# Patient Record
Sex: FEMALE | Race: WHITE | NOT HISPANIC OR LATINO | Employment: STUDENT | URBAN - METROPOLITAN AREA
[De-identification: names, ages, dates, MRNs, and addresses within clinical notes are randomized per-mention and may not be internally consistent; named-entity substitution may affect disease eponyms.]

---

## 2024-01-08 ENCOUNTER — OFFICE VISIT (OUTPATIENT)
Dept: PEDIATRICS | Facility: CLINIC | Age: 19
End: 2024-01-08
Payer: COMMERCIAL

## 2024-01-08 VITALS
WEIGHT: 134 LBS | BODY MASS INDEX: 19.18 KG/M2 | SYSTOLIC BLOOD PRESSURE: 113 MMHG | HEIGHT: 70 IN | HEART RATE: 84 BPM | DIASTOLIC BLOOD PRESSURE: 74 MMHG

## 2024-01-08 DIAGNOSIS — Z00.00 WELLNESS EXAMINATION: Primary | ICD-10-CM

## 2024-01-08 DIAGNOSIS — K58.9 IRRITABLE BOWEL SYNDROME, UNSPECIFIED TYPE: ICD-10-CM

## 2024-01-08 DIAGNOSIS — L70.9 ACNE, UNSPECIFIED ACNE TYPE: ICD-10-CM

## 2024-01-08 PROBLEM — L85.9 HYPERKERATOSIS OF SKIN: Status: RESOLVED | Noted: 2024-01-08 | Resolved: 2024-01-08

## 2024-01-08 PROBLEM — R10.9 ABDOMINAL PAIN: Status: RESOLVED | Noted: 2024-01-08 | Resolved: 2024-01-08

## 2024-01-08 PROBLEM — K21.9 GERD (GASTROESOPHAGEAL REFLUX DISEASE): Status: ACTIVE | Noted: 2024-01-08

## 2024-01-08 PROCEDURE — 96127 BRIEF EMOTIONAL/BEHAV ASSMT: CPT | Performed by: PEDIATRICS

## 2024-01-08 PROCEDURE — 90471 IMMUNIZATION ADMIN: CPT | Performed by: PEDIATRICS

## 2024-01-08 PROCEDURE — 90480 ADMN SARSCOV2 VAC 1/ONLY CMP: CPT | Performed by: PEDIATRICS

## 2024-01-08 PROCEDURE — 90686 IIV4 VACC NO PRSV 0.5 ML IM: CPT | Performed by: PEDIATRICS

## 2024-01-08 PROCEDURE — 99395 PREV VISIT EST AGE 18-39: CPT | Performed by: PEDIATRICS

## 2024-01-08 PROCEDURE — 91320 SARSCV2 VAC 30MCG TRS-SUC IM: CPT | Performed by: PEDIATRICS

## 2024-01-08 RX ORDER — DICYCLOMINE HYDROCHLORIDE 20 MG/1
TABLET ORAL EVERY 6 HOURS
COMMUNITY
Start: 2021-02-19 | End: 2024-01-08 | Stop reason: SDUPTHER

## 2024-01-08 RX ORDER — DESOGESTREL AND ETHINYL ESTRADIOL 0.15-0.03
1 KIT ORAL DAILY
Qty: 84 TABLET | Refills: 3 | Status: SHIPPED | OUTPATIENT
Start: 2024-01-08 | End: 2025-01-07

## 2024-01-08 RX ORDER — ADAPALENE AND BENZOYL PEROXIDE 3; 25 MG/G; MG/G
GEL TOPICAL
Qty: 45 G | Refills: 2 | Status: SHIPPED | OUTPATIENT
Start: 2024-01-08

## 2024-01-08 RX ORDER — ADAPALENE AND BENZOYL PEROXIDE 3; 25 MG/G; MG/G
GEL TOPICAL
COMMUNITY
Start: 2022-01-18 | End: 2024-01-08 | Stop reason: SDUPTHER

## 2024-01-08 RX ORDER — DICYCLOMINE HYDROCHLORIDE 20 MG/1
20 TABLET ORAL 4 TIMES DAILY PRN
Qty: 60 TABLET | Refills: 2 | Status: SHIPPED | OUTPATIENT
Start: 2024-01-08

## 2024-01-08 RX ORDER — DOXYCYCLINE 100 MG/1
CAPSULE ORAL
COMMUNITY
Start: 2022-01-18 | End: 2024-01-08 | Stop reason: ALTCHOICE

## 2024-01-08 ASSESSMENT — PATIENT HEALTH QUESTIONNAIRE - PHQ9
1. LITTLE INTEREST OR PLEASURE IN DOING THINGS: NOT AT ALL
SUM OF ALL RESPONSES TO PHQ9 QUESTIONS 1 AND 2: 0
2. FEELING DOWN, DEPRESSED OR HOPELESS: NOT AT ALL

## 2024-01-08 NOTE — PROGRESS NOTES
"Subjective     Twilaalan Sinclair is here for her annual WCC.    Parental Issues:  Questions or concerns: interested in contraception  Continues iwth IBS sx's- relief with Bentyl- triggers dairy and acidic food like tomato sauce - drinks oat milk       Nutrition, Elimination, and Sleep:  Nutrition:  well-balanced diet, takes foods from each food group  Elimination patterns appropriate: yes  Sleep:  normal   Concerns about body image? 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    Mental Health:  Depression Screening: no risks    CONFIDENTIAL ADOLESCENT SCREEN QUESTIONNAIRE REVIEWED WITH PATIENT AND SCANNED INTO CHART    Objective   /74   Pulse 84   Ht 1.765 m (5' 9.5\")   Wt 60.8 kg (134 lb)   BMI 19.50 kg/m²   Growth parameters are noted reviewed  General:   alert and oriented, in no acute distress   Gait:   normal   Skin:   normal   Oral cavity:   lips, mucosa, and tongue normal; teeth and gums normal   Eyes:   sclerae white, pupils equal and reactive   Ears:   normal bilaterally   Neck:   no adenopathy and thyroid not enlarged, symmetric, no tenderness/mass/nodules   Lungs:  clear to auscultation bilaterally   Heart:   regular rate and rhythm, S1, S2 normal, no murmur, click, rub or gallop   Abdomen:  soft, non-tender; bowel sounds normal; no masses, no organomegaly   :  exam deferred   Pedro Stage:   5   Extremities:  extremities normal, warm and well-perfused; negative forward bend   Neuro:  normal without focal findings and muscle tone and strength normal and symmetric     Assessment/Plan   Well adolescent.   Anticipatory guidance regarding development, safety, nutrition, physical activity, and sleep reviewed.  - Growth:  appropriate for age  - Development:  active and social   - Social:  teenage questionnaire completed and reviewed.  Issues of smoking, " vaping, substance use, sexuality, and mood discussed.    - Vaccines:  as documented  - Return in 1 year for annual well child exam or sooner if concerns arise  Discussed OCP's vs LARC methods

## 2024-03-30 ENCOUNTER — TELEPHONE (OUTPATIENT)
Dept: PEDIATRICS | Facility: CLINIC | Age: 19
End: 2024-03-30
Payer: COMMERCIAL

## 2024-03-30 NOTE — TELEPHONE ENCOUNTER
Twila has 3 areas of possible ringworm. Two on her legs and one on her arm. Mom will have her give home care a try. She has had it in the past. If not resolving in 2 weeks, she will give us a call.

## 2024-05-08 DIAGNOSIS — L70.9 ACNE, UNSPECIFIED ACNE TYPE: ICD-10-CM

## 2024-06-17 ENCOUNTER — OFFICE VISIT (OUTPATIENT)
Dept: PEDIATRICS | Facility: CLINIC | Age: 19
End: 2024-06-17
Payer: COMMERCIAL

## 2024-06-17 DIAGNOSIS — N30.01 ACUTE CYSTITIS WITH HEMATURIA: Primary | ICD-10-CM

## 2024-06-17 LAB
POC APPEARANCE, URINE: CLEAR
POC BILIRUBIN, URINE: NEGATIVE
POC BLOOD, URINE: ABNORMAL
POC COLOR, URINE: YELLOW
POC GLUCOSE, URINE: NEGATIVE MG/DL
POC KETONES, URINE: NEGATIVE MG/DL
POC LEUKOCYTES, URINE: ABNORMAL
POC NITRITE,URINE: NEGATIVE
POC PH, URINE: 6 PH
POC PROTEIN, URINE: ABNORMAL MG/DL
POC SPECIFIC GRAVITY, URINE: 1.02
POC UROBILINOGEN, URINE: 0.2 EU/DL

## 2024-06-17 PROCEDURE — 81003 URINALYSIS AUTO W/O SCOPE: CPT | Performed by: PEDIATRICS

## 2024-06-17 PROCEDURE — 87086 URINE CULTURE/COLONY COUNT: CPT

## 2024-06-17 PROCEDURE — 99213 OFFICE O/P EST LOW 20 MIN: CPT | Performed by: PEDIATRICS

## 2024-06-17 RX ORDER — AMOXICILLIN AND CLAVULANATE POTASSIUM 875; 125 MG/1; MG/1
875 TABLET, FILM COATED ORAL 2 TIMES DAILY
Qty: 14 TABLET | Refills: 0 | Status: SHIPPED | OUTPATIENT
Start: 2024-06-17 | End: 2024-06-24

## 2024-06-17 NOTE — PROGRESS NOTES
Twila Sinclair is a 19 y.o. female who presents for UTI.      HPI  Twila noticed some blood in her urine since yesterday as well as some hesitancy.  No fever or abd pain.  She is currently sexually active.  No discharge or concern for STI.    Objective   There were no vitals taken for this visit.    Physical Exam  Constitutional:       Appearance: She is normal weight.   Abdominal:      General: There is no distension.      Palpations: Abdomen is soft.         Assessment/Plan       Her clinical presentation and examination indicates the diagnosis of   1. Acute cystitis with hematuria  POCT UA Automated manually resulted    amoxicillin-pot clavulanate (Augmentin) 875-125 mg tablet    Urine Culture        Urine culture sent and abx prescribed.  Discussed UTI prevention    Today we discussed a typical course of illness, symptomatic treatment, and signs of worsening/when to seek medical care.  Supportive care measures and expected course of condition reviewed.  Followup as needed no improvement.

## 2024-06-18 LAB — BACTERIA UR CULT: NORMAL

## 2024-09-19 DIAGNOSIS — L70.9 ACNE, UNSPECIFIED ACNE TYPE: ICD-10-CM

## 2024-09-20 RX ORDER — DESOGESTREL AND ETHINYL ESTRADIOL 0.15-0.03
1 KIT ORAL DAILY
Qty: 84 TABLET | Refills: 1 | Status: SHIPPED | OUTPATIENT
Start: 2024-09-20

## 2024-11-16 DIAGNOSIS — L70.9 ACNE, UNSPECIFIED ACNE TYPE: ICD-10-CM

## 2024-11-18 RX ORDER — DESOGESTREL AND ETHINYL ESTRADIOL 0.15-0.03
1 KIT ORAL DAILY
Qty: 84 TABLET | Refills: 0 | Status: SHIPPED | OUTPATIENT
Start: 2024-11-18

## 2024-12-16 ENCOUNTER — TELEPHONE (OUTPATIENT)
Dept: PEDIATRICS | Facility: CLINIC | Age: 19
End: 2024-12-16
Payer: COMMERCIAL

## 2024-12-16 DIAGNOSIS — L70.9 ACNE, UNSPECIFIED ACNE TYPE: Primary | ICD-10-CM

## 2024-12-16 RX ORDER — NORETHINDRONE ACETATE AND ETHINYL ESTRADIOL 1MG-20(21)
1 KIT ORAL DAILY
Qty: 84 TABLET | Refills: 3 | Status: SHIPPED | OUTPATIENT
Start: 2024-12-16

## 2024-12-16 NOTE — TELEPHONE ENCOUNTER
Twila would like to change her birth control. Her current OCP is giving child headaches. Child is due for a WCC after 1/8/25. Please advise if you want an appointment? Thanks     [phone]

## 2024-12-17 ENCOUNTER — OFFICE VISIT (OUTPATIENT)
Dept: GASTROENTEROLOGY | Facility: CLINIC | Age: 19
End: 2024-12-17
Payer: COMMERCIAL

## 2024-12-17 VITALS
DIASTOLIC BLOOD PRESSURE: 72 MMHG | HEART RATE: 61 BPM | WEIGHT: 149 LBS | BODY MASS INDEX: 22.07 KG/M2 | SYSTOLIC BLOOD PRESSURE: 121 MMHG | OXYGEN SATURATION: 99 % | HEIGHT: 69 IN

## 2024-12-17 DIAGNOSIS — K58.0 IRRITABLE BOWEL SYNDROME WITH DIARRHEA: Primary | ICD-10-CM

## 2024-12-17 PROCEDURE — 3008F BODY MASS INDEX DOCD: CPT

## 2024-12-17 PROCEDURE — 99204 OFFICE O/P NEW MOD 45 MIN: CPT

## 2024-12-17 PROCEDURE — 1036F TOBACCO NON-USER: CPT

## 2024-12-17 RX ORDER — HYOSCYAMINE SULFATE 0.12 MG/1
0.12 TABLET, ORALLY DISINTEGRATING ORAL
Qty: 120 TABLET | Refills: 2 | Status: SHIPPED | OUTPATIENT
Start: 2024-12-17 | End: 2025-03-17

## 2024-12-17 RX ORDER — HYOSCYAMINE SULFATE 0.12 MG/1
0.12 TABLET, ORALLY DISINTEGRATING ORAL 4 TIMES DAILY PRN
Qty: 120 TABLET | Refills: 2 | Status: SHIPPED | OUTPATIENT
Start: 2024-12-17 | End: 2024-12-17

## 2024-12-17 ASSESSMENT — ENCOUNTER SYMPTOMS
ANAL BLEEDING: 0
ROS GI COMMENTS: SEE HPI
CONSTIPATION: 0
DIARRHEA: 1
ABDOMINAL DISTENTION: 1
ABDOMINAL PAIN: 1
BLOOD IN STOOL: 0

## 2024-12-17 NOTE — PATIENT INSTRUCTIONS
Start hyosciamine three times daily before meals and at bedtime  Stool tests/labs    IBS  Irritable bowel syndrome (IBS) is a common disorder that affects the GI tract. Signs and symptoms include cramping, abdominal pain, bloating, gas, diarrhea/constipation, or both. People with IBS may have a change in how their bowel movements look. IBS is a chronic condition, but symptoms can be controlled by managing diet, lifestyle and stress. IBS does not cause changes in the tissue of the intestine or increase your risk of colon cancer. IBS is believed to be caused by changes in the nerves and muscles that control gut sensation and movement. IBS can be worsened by different triggers, stress, and anxiety. IBS is a real medical condition, but it is not life threatening and will not lead to other serious diseases.    Ways to help IBS symptoms:   -Eliminate all lactose containing products for 4 weeks to see if your symptoms improve    -Some people with IBS symptoms improve if they stop eating gluten (wheat, barley and rye) even if they do not have celiac disease.    -Some people are sensitive to certain carbohydrates such as fructans, lactose and others known as FODMAPs (fermentable oligosaccharide, disaccharide, monosaccharide and polyols) which are found in certain grains, vegetables, fruits and dairy products. Your symptoms might get better if you follow a low FODMAP diet and then reintroduce foods one at a time. www.fodmapeverTalko.com is a great web site with instructions on how to do the low FODMAP diet. It also has recipes and lifestyle tips that help IBS symptoms.     -For cramping and bloating, try enteric coated Peppermint Oil Capsules 150-200 mg up to 3 times daily- they can have a side effect of heartburn. If you are prone to heartburn, take the peppermint oil at least 1 hour before eating, on an empty stomach.  Serena's Tummy tamers is a brand patients with IBS will use, you only need one softgel up to 3 times daily  as needed.   Alternately, you can try IBgard 90 mg capsules. Take 1-2 IBgard capsules up to three times a day as needed for symptoms.    -Exercise regularly, walking is a fast, free, and easy way to improve abdominal pain in IBS.  Some people feel better with relaxation techniques and regular exercise or having a hobby. Meeting with a counselor can also help.    -Take time to yourself every day for self care or a hobby that you love. 10 minutes of alone time can go a long way and is less than 1 percent of your day! Taking care of yourself is not selfish!    -Schedule a follow up appointment in 2-3 months. Sabra Fraga, Lulu Ponce, Estevan Rodríguez are all nurse practitioners closer to you at [facility]    -Please call the office at [phone] with any questions or concerns.

## 2024-12-17 NOTE — PROGRESS NOTES
"History Of Present Illness  Twila Sinclair is a 19 y.o. female presenting to GI clinic with a chief complaint of bloating, abdominal pain.  She is here with her dad.  Patient notes lifelong symptoms, had colic as a baby. She has been seeing [facility] since fifth grade, last seen in 2021 for symptoms.  Had an unremarkable fecal calprotectin, TTG IgG, CRP, TSH, lipase, CMP, CBC, IgA level.    Patient has long history of sharp abdominal pain with bloating after meals.  She needs to lie down in order to feel better.  She has sensation that foods are sitting in her upper abdomen after eating.  As food is digested and \"drops down\" in her abdomen, she has further discomfort wherever it moves until it reaches a certain level.  This can take a few hours after eating. She states that she is unable to belch, but once she releases gas or has a bowel movement she feels better.  She normally has at least 3 bowel movements per day after eating, denies BRBPR, melena, hematochezia, unintentional weight loss.  Patient notes spicy foods, dairy especially exacerbate symptoms.  Stress also exacerbate symptoms-she is in finals week at [employer] and symptoms have been worse.  She normally feels better after she goes to bed but woke up the other morning with continued pain at 3 AM.    Patient has tried prevacid and probiotics, FODMAP diet.  She does feel that Align probiotics have been helpful in the mornings, however she has only been taking these for about a week and a half.    Social History  She reports that she has never smoked. She has never used smokeless tobacco. No history on file for alcohol use and drug use.  She does not take NSAIDs on a regular basis      Family History  No family history on file.  The patient does not have a FH of CRC. she does have a FH of IBD. Maternal aunt (Crohn's and gallbladder disease); maternal relatives (GI ulcers and RA); MGM (lupus); MGF (multiple myeloma and gallbladder disease).     Review " "of Systems   Gastrointestinal:  Positive for abdominal distention, abdominal pain and diarrhea. Negative for anal bleeding, blood in stool and constipation.        See HPI   All other systems reviewed and are negative.        Physical Exam  Constitutional:       General: She is not in acute distress.     Appearance: Normal appearance. She is normal weight. She is not ill-appearing.   HENT:      Head: Normocephalic and atraumatic.      Mouth/Throat:      Mouth: Mucous membranes are moist.   Eyes:      General: No scleral icterus.     Conjunctiva/sclera: Conjunctivae normal.      Pupils: Pupils are equal, round, and reactive to light.   Pulmonary:      Effort: Pulmonary effort is normal.   Abdominal:      General: Bowel sounds are normal. There is no distension.      Palpations: Abdomen is soft. There is no mass.      Tenderness: There is no abdominal tenderness.      Hernia: No hernia is present.   Musculoskeletal:         General: Normal range of motion.      Cervical back: Normal range of motion.   Skin:     General: Skin is warm and dry.      Coloration: Skin is not jaundiced or pale.   Neurological:      Mental Status: She is alert. Mental status is at baseline.   Psychiatric:         Mood and Affect: Mood normal.         Behavior: Behavior normal.          Last Vital Signs  /72   Pulse 61   Ht 1.753 m (5' 9\")   Wt 67.6 kg (149 lb)   SpO2 99%   BMI 22.00 kg/m²      Relevant Results  Lab Results   Component Value Date    WBC 8.5 02/19/2021    HGB 12.8 02/19/2021    HCT 40.3 02/19/2021    MCV 96 02/19/2021     02/19/2021      Lab Results   Component Value Date    GLUCOSE 92 02/19/2021    CALCIUM 9.6 02/19/2021     02/19/2021    K 4.4 02/19/2021    CO2 27 02/19/2021     02/19/2021    BUN 8 02/19/2021    CREATININE 0.72 02/19/2021      Lab Results   Component Value Date    ALT 12 02/19/2021    AST 15 02/19/2021    ALKPHOS 94 (H) 02/19/2021    BILITOT 0.5 02/19/2021    BILIDIR 0.1 " "2021    No results found for: \"IRON\", \"TIBC\", \"IRONSAT\", \"FERRITIN\", \"PCXQNIPZ87\", \"FOLATE\"    Lab Results   Component Value Date    CALPS 11 2021    CRP <0.10 2021      Lab Results   Component Value Date    LIPASE 40 2021    No results found for: \"HGBA1C\"       - Westergren ESR: 2020: Normal.  - TTG Ig2020: Normal.     EGD/COLONOSCOPY/COLOGUARD  EGD- never       Colonoscopy- never       Assessment/Plan    Assessment & Plan  Irritable bowel syndrome with diarrhea  Patient with longstanding IBS-D symptoms (at least 3 bowel movements daily, bloating and sharp abdominal pain after eating, relieved with bowel movements and passing gas).  She saw peds GI with an unremarkable workup so far  Infectious stool studies, CRP, BMP, CBC, TSH ordered  Had minimal response to dicyclomine in the past, will start Levsin 4 times daily before meals and at bedtime  Orders:    H. Pylori Antigen, Stool; Future    Stool Pathogen Panel, PCR; Future    Ova/Para + Giardia/Cryptosporidium Antigen; Future    hyoscyamine 0.125 mg disintegrating tablet; Place 1 tablet (0.125 mg) under the tongue before breakfast, before lunch, before evening meal, and at bedtime.    CBC; Future    Basic Metabolic Panel; Future    TSH with reflex to Free T4 if abnormal; Future    C-Reactive Protein; Future    Follow-up 2 to 3 months, closer to [facility] since my office is located in [place].       Angie Beckham, ARTEM-CNP  24   "

## 2024-12-17 NOTE — ASSESSMENT & PLAN NOTE
Patient with longstanding IBS-D symptoms (at least 3 bowel movements daily, bloating and sharp abdominal pain after eating, relieved with bowel movements and passing gas).  She saw peds GI with an unremarkable workup so far  Infectious stool studies, CRP, BMP, CBC, TSH ordered  Had minimal response to dicyclomine in the past, will start Levsin 4 times daily before meals and at bedtime  Orders:    H. Pylori Antigen, Stool; Future    Stool Pathogen Panel, PCR; Future    Ova/Para + Giardia/Cryptosporidium Antigen; Future    hyoscyamine 0.125 mg disintegrating tablet; Place 1 tablet (0.125 mg) under the tongue before breakfast, before lunch, before evening meal, and at bedtime.    CBC; Future    Basic Metabolic Panel; Future    TSH with reflex to Free T4 if abnormal; Future    C-Reactive Protein; Future

## 2024-12-31 ENCOUNTER — OFFICE VISIT (OUTPATIENT)
Dept: PEDIATRICS | Facility: CLINIC | Age: 19
End: 2024-12-31
Payer: COMMERCIAL

## 2024-12-31 ENCOUNTER — TELEPHONE (OUTPATIENT)
Dept: PEDIATRICS | Facility: CLINIC | Age: 19
End: 2024-12-31

## 2024-12-31 VITALS — WEIGHT: 143.6 LBS | TEMPERATURE: 98 F | BODY MASS INDEX: 21.21 KG/M2

## 2024-12-31 DIAGNOSIS — J01.00 ACUTE NON-RECURRENT MAXILLARY SINUSITIS: Primary | ICD-10-CM

## 2024-12-31 PROCEDURE — G2211 COMPLEX E/M VISIT ADD ON: HCPCS | Performed by: PEDIATRICS

## 2024-12-31 PROCEDURE — 99213 OFFICE O/P EST LOW 20 MIN: CPT | Performed by: PEDIATRICS

## 2024-12-31 RX ORDER — AMOXICILLIN 875 MG/1
875 TABLET, FILM COATED ORAL 2 TIMES DAILY
Qty: 20 TABLET | Refills: 0 | Status: SHIPPED | OUTPATIENT
Start: 2024-12-31 | End: 2025-01-10

## 2024-12-31 NOTE — TELEPHONE ENCOUNTER
Twila calling- earache started last night, believes she is getting the start of an infection.  Wanted to do a virtual, advised to come in for eval.

## 2024-12-31 NOTE — PROGRESS NOTES
Subjective     History was provided by mother and Twila .    Twila is here with the following concern:    L sided ear pain, congestion, L sinus pressure, no fever    Objective     Temp 36.7 °C (98 °F)   Wt 65.1 kg (143 lb 9.6 oz)   BMI 21.21 kg/m²       General:  Well-appearing, well hydrated and in no acute distress     Eyes:  Lids:  normal  Conjunctivae:  normal     ENT:  Ears:  RTM: normal yes           LTM:  normal yes  Nose:  nares clear  Mouth:  mucosa moist; no visible lesions  Throat:  OP clear yes and moist; uvula midline  Neck:  supple     Respiratory:  Respiratory rate:  normal  Air exchange:  normal   Adventitious breath sounds:  none  Accessory muscle use:  none     Heart:  Regular rate and rhythm, no murmur     GI: Normal bowel sounds, soft, non-tender, no HSM     Skin:  Warm and well-perfused and no rashes apparent     Lymphatic: No nodes larger than 1 cm palpated  No firm or fixed nodes palpated       Assessment/Plan     Twila Sinclair is well-appearing, well-hydrated, in no acute distress, and afebrile at today's visit.    Her clinical presentation and examination indicates the diagnosis of clinical sinusitis with L ear pain    Her treatment plan includes Tylenol for comfort, Amox as prescribed, Flonase NS daily until sx subside    Supportive care measures and expected course of illness reviewed.    Follow up promptly for worsening or prolonged illness.    Dylan Pires MD MPH

## 2025-01-06 ENCOUNTER — APPOINTMENT (OUTPATIENT)
Dept: PEDIATRICS | Facility: CLINIC | Age: 20
End: 2025-01-06
Payer: COMMERCIAL

## 2025-01-09 ENCOUNTER — TELEPHONE (OUTPATIENT)
Facility: CLINIC | Age: 20
End: 2025-01-09
Payer: COMMERCIAL

## 2025-01-09 DIAGNOSIS — K58.0 IRRITABLE BOWEL SYNDROME WITH DIARRHEA: ICD-10-CM

## 2025-01-09 NOTE — TELEPHONE ENCOUNTER
Patient calling to report that the hyoscyamine is working really well. She finds it hard to remember 4 times a day. She thought there was something mentioned about a more permanent medication if this worked well.   Please advise.

## 2025-01-10 RX ORDER — HYOSCYAMINE SULFATE 0.38 MG/1
0.38 TABLET, EXTENDED RELEASE ORAL EVERY 12 HOURS
Qty: 180 TABLET | Refills: 0 | Status: SHIPPED | OUTPATIENT
Start: 2025-01-10 | End: 2025-04-10

## 2025-01-10 RX ORDER — HYOSCYAMINE SULFATE 0.12 MG/1
0.12 TABLET, ORALLY DISINTEGRATING ORAL 4 TIMES DAILY PRN
Qty: 120 TABLET | Refills: 2 | Status: SHIPPED | OUTPATIENT
Start: 2025-01-10 | End: 2025-04-10

## 2025-02-20 ENCOUNTER — APPOINTMENT (OUTPATIENT)
Dept: GASTROENTEROLOGY | Facility: CLINIC | Age: 20
End: 2025-02-20
Payer: COMMERCIAL

## 2025-03-06 ENCOUNTER — APPOINTMENT (OUTPATIENT)
Dept: PRIMARY CARE | Facility: CLINIC | Age: 20
End: 2025-03-06
Payer: COMMERCIAL

## 2025-04-07 DIAGNOSIS — K58.0 IRRITABLE BOWEL SYNDROME WITH DIARRHEA: ICD-10-CM

## 2025-04-07 RX ORDER — HYOSCYAMINE SULFATE 0.38 MG/1
TABLET, EXTENDED RELEASE ORAL
Qty: 180 TABLET | Refills: 0 | Status: SHIPPED | OUTPATIENT
Start: 2025-04-07

## 2025-07-23 DIAGNOSIS — K58.0 IRRITABLE BOWEL SYNDROME WITH DIARRHEA: ICD-10-CM

## 2025-07-24 ENCOUNTER — TELEPHONE (OUTPATIENT)
Dept: GASTROENTEROLOGY | Facility: CLINIC | Age: 20
End: 2025-07-24
Payer: COMMERCIAL

## 2025-07-24 DIAGNOSIS — K58.0 IRRITABLE BOWEL SYNDROME WITH DIARRHEA: ICD-10-CM

## 2025-07-24 NOTE — TELEPHONE ENCOUNTER
Patient calling to report the LEVBID is working really well and she would like a refill sent to [address]   Pended refill.

## 2025-07-25 RX ORDER — HYOSCYAMINE SULFATE 0.38 MG/1
TABLET, EXTENDED RELEASE ORAL
Qty: 180 TABLET | Refills: 0 | OUTPATIENT
Start: 2025-07-25

## 2025-07-25 RX ORDER — HYOSCYAMINE SULFATE 0.38 MG/1
0.38 TABLET, EXTENDED RELEASE ORAL EVERY 12 HOURS
Qty: 180 TABLET | Refills: 0 | Status: SHIPPED | OUTPATIENT
Start: 2025-07-25 | End: 2025-10-23

## 2025-08-27 ENCOUNTER — OFFICE VISIT (OUTPATIENT)
Dept: PEDIATRICS | Facility: CLINIC | Age: 20
End: 2025-08-27
Payer: COMMERCIAL

## 2025-08-27 ENCOUNTER — TELEPHONE (OUTPATIENT)
Dept: PEDIATRICS | Facility: CLINIC | Age: 20
End: 2025-08-27
Payer: COMMERCIAL

## 2025-08-27 VITALS — WEIGHT: 142.4 LBS | BODY MASS INDEX: 21.09 KG/M2 | HEIGHT: 69 IN | TEMPERATURE: 97.8 F

## 2025-08-27 DIAGNOSIS — J01.01 ACUTE RECURRENT MAXILLARY SINUSITIS: Primary | ICD-10-CM

## 2025-08-27 PROCEDURE — 99214 OFFICE O/P EST MOD 30 MIN: CPT | Performed by: PEDIATRICS

## 2025-08-27 PROCEDURE — 3008F BODY MASS INDEX DOCD: CPT | Performed by: PEDIATRICS

## 2025-08-27 PROCEDURE — 1036F TOBACCO NON-USER: CPT | Performed by: PEDIATRICS

## 2025-08-27 RX ORDER — AMOXICILLIN AND CLAVULANATE POTASSIUM 875; 125 MG/1; MG/1
875 TABLET, FILM COATED ORAL 2 TIMES DAILY
Qty: 20 TABLET | Refills: 0 | Status: SHIPPED | OUTPATIENT
Start: 2025-08-27 | End: 2025-09-06

## 2025-08-29 PROBLEM — N92.6 IRREGULAR MENSTRUAL CYCLE: Status: RESOLVED | Noted: 2025-08-15 | Resolved: 2025-08-29
